# Patient Record
(demographics unavailable — no encounter records)

---

## 2025-01-22 NOTE — HISTORY OF PRESENT ILLNESS
[FreeTextEntry1] : 01/22/2025 67 y/o male presents with a f/u visit for BPH w/ LUTS   Pt is on Alfuzosin 10 MG. Pt reports frequent hesitancy. Pt states is not satisfied with medication and would like to explore other options. Pt denies gross hematuria, dysuria, and urgency

## 2025-01-22 NOTE — LETTER BODY
[Dear  ___] : Dear  [unfilled], [Consult Letter:] : I had the pleasure of evaluating your patient, [unfilled]. [Please see my note below.] : Please see my note below. [Consult Closing:] : Thank you very much for allowing me to participate in the care of this patient.  If you have any questions, please do not hesitate to contact me. [Sincerely,] : Sincerely, [FreeTextEntry3] :  Dr. Jose J Rebolledo MD

## 2025-01-22 NOTE — PHYSICAL EXAM
[General Appearance - In No Acute Distress] : no acute distress [Chaperone Present] : A chaperone was present in the examining room during all aspects of the physical examination [FreeTextEntry2] : Marcella Altamirano

## 2025-01-22 NOTE — END OF VISIT
[Time Spent: ___ minutes] : I have spent [unfilled] minutes of time on the encounter which excludes teaching and separately reported services. [FreeTextEntry4] :    This note was written by Marcella Altamirano on 01/22/2025 actively solely Dr. Jose J Mccracken M.D. I, Marcella Altamirano, am scribing for and in the presence of Dr. Jose J Rebolledo M.D. in the following sections HISTORY OF PRESENT ILLNESS, PAST MEDICAL/FAMILY/SOCIAL HISTORY; REVIEW OF SYSTEMS; VITAL SIGNS; PHYSICAL EXAM; ASSESSMENT/PLAN.       All medical record entries made by this scribe at my, Dr. Jose J Rebolledo M.D. direction and personally dictated by me on 01/22/2025. I personally performed the services described in the documentation, reviewed the documentation recorded by the scribe in my presence, and it accurately and completely records my words and actions.

## 2025-01-22 NOTE — ASSESSMENT
[FreeTextEntry1] : PSA: 05/11/22: 2.51 ng/mL PSA drawn today  Uroflow   	Maximum Flow	3.1	 	 		  	Average Flow	1.9	 	 		  	Voiding Time	20.916.2	 	 		  	Flow Time	16.2	 	 		  	Time to Max Flow	10.7	 	 		  	Voided Volume	31 ml	 	 		  	Residual Volume	0   PVR 0 cc   Renal Pelvic US today to evaluate prostate.  Findings: Both kidneys are normal in size and echogenicity without stones, hydronephrosis or solid masses visualized Prostate gland volume: 29 cc   Will continue Alfuzosin 10 MG.   RTC 3 months to reevaluate symptoms.

## 2025-04-23 NOTE — LETTER BODY
[Dear  ___] : Dear  [unfilled], [Please see my note below.] : Please see my note below. [Sincerely,] : Sincerely, [Courtesy Letter:] : I had the pleasure of seeing your patient, [unfilled], in my office today. [FreeTextEntry3] :  Dr. Jose J Rebolledo MD

## 2025-04-23 NOTE — HISTORY OF PRESENT ILLNESS
[FreeTextEntry1] : 01/22/2025 69 y/o male presents with a f/u visit for BPH w/ LUTS   Pt is on Alfuzosin 10 MG. Pt reports frequent hesitancy. Pt states is not satisfied with medication and would like to explore other options. Pt denies gross hematuria, dysuria, and urgency

## 2025-04-23 NOTE — ASSESSMENT
[FreeTextEntry1] : PSA: 05/11/22: 2.51 ng/mL PSA drawn today  Uroflow   	Maximum Flow	3.1	 	 		  	Average Flow	1.9	 	 		  	Voiding Time	20.916.2	 	 		  	Flow Time	16.2	 	 		  	Time to Max Flow	10.7	 	 		  	Voided Volume	31 ml	 	 		  	Residual Volume	0   PVR 0 cc   Renal Pelvic US today to evaluate prostate.  Findings: Both kidneys are normal in size and echogenicity without stones, hydronephrosis or solid masses visualized Prostate gland volume: 29 cc   Will continue Alfuzosin 10 MG.   RTC 3 months to reevaluate symptoms.  04/23/2025 --------Assessment and recommendations 22 Jan 25 -PSA-1.82   Jero has been experiencing urinary hesitancy. This symptom is not constant but is experienced at times, especially in the early morning hours.   kathy Nogueira's hesitancy could be due to a normal physiological response when the rectum is filled with stool. It's recommended that he observes these instances to verify if there's a correlation. His Prostate-Specific Antigen (PSA) levels are well within normal range   - Plan : - Jero to continue Alfuzosin for a month while observing incidences of urinary hesitancy particularly in the mornings.  - Jero to assess need for Alfuzosin after one month and potentially stop if symptoms do not persist.  - Follow-up appointment after six months, or sooner if symptoms become bothersome.

## 2025-04-23 NOTE — END OF VISIT
[FreeTextEntry4] :    This note was written by Marcella Altamirano on 01/22/2025 actively solely Dr. Jose J Mccracken M.D. I, Marcella Altamirano, am scribing for and in the presence of Dr. Jose J Rebolledo M.D. in the following sections HISTORY OF PRESENT ILLNESS, PAST MEDICAL/FAMILY/SOCIAL HISTORY; REVIEW OF SYSTEMS; VITAL SIGNS; PHYSICAL EXAM; ASSESSMENT/PLAN.       All medical record entries made by this scribe at my, Dr. Jose J Rebolledo M.D. direction and personally dictated by me on 01/22/2025. I personally performed the services described in the documentation, reviewed the documentation recorded by the scribe in my presence, and it accurately and completely records my words and actions. [Time Spent: ___ minutes] : I have spent [unfilled] minutes of time on the encounter which excludes teaching and separately reported services.